# Patient Record
Sex: MALE | Race: WHITE
[De-identification: names, ages, dates, MRNs, and addresses within clinical notes are randomized per-mention and may not be internally consistent; named-entity substitution may affect disease eponyms.]

---

## 2018-05-22 ENCOUNTER — HOSPITAL ENCOUNTER (EMERGENCY)
Dept: HOSPITAL 97 - ER | Age: 78
Discharge: HOME | End: 2018-05-22
Payer: COMMERCIAL

## 2018-05-22 VITALS — DIASTOLIC BLOOD PRESSURE: 81 MMHG | SYSTOLIC BLOOD PRESSURE: 158 MMHG | OXYGEN SATURATION: 99 % | TEMPERATURE: 98.2 F

## 2018-05-22 DIAGNOSIS — L02.213: Primary | ICD-10-CM

## 2018-05-22 DIAGNOSIS — Z95.818: ICD-10-CM

## 2018-05-22 DIAGNOSIS — I10: ICD-10-CM

## 2018-05-22 PROCEDURE — 0J960ZZ DRAINAGE OF CHEST SUBCUTANEOUS TISSUE AND FASCIA, OPEN APPROACH: ICD-10-PCS

## 2018-05-22 PROCEDURE — 99283 EMERGENCY DEPT VISIT LOW MDM: CPT

## 2018-05-22 NOTE — ER
Nurse's Notes                                                                                     

 [facility]                                                                

Name: Julien Calderon                                                                               

Age: 77 yrs                                                                                       

Sex: Male                                                                                         

: 1940                                                                                   

MRN: [id number]                                                                                   

Arrival Date: 2018                                                                          

Time: 10:19                                                                                       

Account#: [id number]                                                                            

Bed 15                                                                                            

Private MD:                                                                                       

Diagnosis: Cutaneous abscess of chest wall                                                        

                                                                                                  

Presentation:                                                                                     

                                                                                             

10:26 Presenting complaint: Patient states: i have a sore under my left arm for 10 days now.  tw2 

      Transition of care: patient was not received from another setting of care. Onset of         

      symptoms was May 22, 2018. Risk Assessment: Do you want to hurt yourself or someone         

      else? Patient reports no desire to harm self or others. Initial Sepsis Screen: Does the     

      patient meet any 2 criteria? No. Patient's initial sepsis screen is negative. Does the      

      patient have a suspected source of infection? No. Patient's initial sepsis screen is        

      negative. Care prior to arrival: None.                                                      

10:26 Method Of Arrival: Ambulatory                                                           tw2 

10:26 Acuity: KERMIT 3                                                                           tw2 

                                                                                                  

Triage Assessment:                                                                                

10:27 General: Appears in no apparent distress. Behavior is calm, cooperative, appropriate    tw2 

      for age. Derm: raised, reddened area noted on anterior left chest wall area Abscess         

      located on left lateral anterior chest.                                                     

                                                                                                  

Historical:                                                                                       

- Allergies:                                                                                      

10:28 No Known Allergies;                                                                     tw2 

- PMHx:                                                                                           

10:28 CAD; Diabetes - IDDM; GERD; Hypertension; Myocardial infarction;                        tw2 

- PSHx:                                                                                           

10:28 CARDIAC STENTS X5; Knee surgery;                                                        tw2 

                                                                                                  

- Immunization history:: Adult Immunizations up to date.                                          

- Social history:: Smoking status: Patient/guardian denies using tobacco.                         

                                                                                                  

                                                                                                  

Screenin:23 Abuse screen: Denies threats or abuse. Denies injuries from another. Nutritional        ch  

      screening: No deficits noted. Tuberculosis screening: No symptoms or risk factors           

      identified. Fall Risk None identified.                                                      

                                                                                                  

Assessment:                                                                                       

11:23 General: Appears in no apparent distress. comfortable, Behavior is calm, cooperative,   ch  

      appropriate for age. Pain: Complains of pain in left lateral anterior chest Pain            

      currently is 2 out of 10 on a pain scale. Neuro: No deficits noted. Respiratory: Airway     

      is patent Trachea midline Respiratory effort is even, unlabored. GI: No signs and/or        

      symptoms were reported involving the gastrointestinal system. EENT: No signs and/or         

      symptoms were reported regarding the EENT system. Derm: Skin is intact, Skin is pink,       

      warm \T\ dry. Abscess located on left lateral anterior chest is half dollar sized, has no   

      drainage, is hot to touch, is red, is raised. Musculoskeletal: Circulation, motion, and     

      sensation intact.                                                                           

                                                                                                  

Vital Signs:                                                                                      

11:23  / 81; Pulse 76; Resp 18; Temp 98.2; Pulse Ox 99% on R/A; Pain 0/10;              ch  

                                                                                                  

ED Course:                                                                                        

10:19 Patient arrived in ED.                                                                  mr  

10:27 Triage completed.                                                                       tw2 

10:28 Arm band placed on.                                                                     tw2 

10:55 Nabil Holland MD is Attending Physician.                                              gs  

11:11 Fauzia Perry, RN is Primary Nurse.                                                ch  

11:23 No apparent distress. Resting quietly.                                                  ch  

11:23 Patient has correct armband on for positive identification. Placed in gown. Bed in low  ch  

      position. Call light in reach. Side rails up X 1. Adult w/ patient. Warm blanket given.     

11:23 No provider procedures requiring assistance completed. Patient did not have IV access   ch  

      during this emergency room visit.                                                           

11:23 Wound care: to abscess located on left lateral anterior chest was cleaned with soap and ch  

      water, dressed with Neosporin, 4X4s, band aid, Patient tolerated well.                      

                                                                                                  

Administered Medications:                                                                         

No medications were administered                                                                  

                                                                                                  

                                                                                                  

Outcome:                                                                                          

11:10 Discharge ordered by MD.                                                                  

11:23 Discharged to home ambulatory, with family.                                               

11:23 Condition: stable                                                                           

11:23 Discharge instructions given to patient, family, Instructed on discharge instructions,      

      follow up and referral plans. medication usage, Demonstrated understanding of               

      instructions, follow-up care, medications, Prescriptions given X 1.                         

11:27 Patient left the ED.                                                                    ch  

                                                                                                  

Signatures:                                                                                       

Fauzia Perry, RN                  Gretchen Rodriguez ch                                                                                   

Nguyen Mcduffie RN RN   tw2                                                  

Nabil Holland MD MD                                                      

                                                                                                  

**************************************************************************************************

## 2018-05-22 NOTE — EDPHYS
Physician Documentation                                                                           

 [facility]                                                                

Name: Julien Calderon                                                                               

Age: 77 yrs                                                                                       

Sex: Male                                                                                         

: 1940                                                                                   

MRN: [id number]                                                                                   

Arrival Date: 2018                                                                          

Time: 10:19                                                                                       

Account#: [id number]                                                                            

Bed 15                                                                                            

Private MD:                                                                                       

ED Physician Nabil Holland                                                                       

HPI:                                                                                              

                                                                                             

11:06 This 77 yrs old  Male presents to ER via Ambulatory with complaints of Skin    gs  

      Sore(s).                                                                                    

11:06 The patient presents with an abscess of the left lateral anterior chest, the patient    gs  

      presents with a swollen area of the . Description: The affected area is small,              

      confluent, erythematous. Onset: The symptoms/episode began/occurred 10 day(s) ago.          

      Possible cause(s): unknown. Associated signs and symptoms: Pertinent positives:             

      erythema, Pertinent negatives: drainage, fever. Severity of symptoms: At their worst        

      the symptoms were moderate, in the emergency department the symptoms are unchanged. The     

      patient has not experienced similar symptoms in the past.                                   

                                                                                                  

Historical:                                                                                       

- Allergies:                                                                                      

10:28 No Known Allergies;                                                                     tw2 

- PMHx:                                                                                           

10:28 CAD; Diabetes - IDDM; GERD; Hypertension; Myocardial infarction;                        tw2 

- PSHx:                                                                                           

10:28 CARDIAC STENTS X5; Knee surgery;                                                        tw2 

                                                                                                  

- Immunization history:: Adult Immunizations up to date.                                          

- Social history:: Smoking status: Patient/guardian denies using tobacco.                         

                                                                                                  

                                                                                                  

ROS:                                                                                              

11:06 All other systems are negative.                                                         gs  

                                                                                                  

Exam:                                                                                             

11:06 Chest/axilla:  Normal chest wall appearance and motion.  Nontender with no deformity.   gs  

      No lesions are appreciated. Cardiovascular:  Regular rate and rhythm with a normal S1       

      and S2.  No gallops, murmurs, or rubs.  Normal PMI, no JVD.  No pulse deficits.             

      Respiratory:  Lungs have equal breath sounds bilaterally, clear to auscultation and         

      percussion.  No rales, rhonchi or wheezes noted.  No increased work of breathing, no        

      retractions or nasal flaring. Abdomen/GI:  Soft, non-tender, with normal bowel sounds.      

      No distension or tympany.  No guarding or rebound.  No evidence of tenderness               

      throughout.                                                                                 

11:06 Constitutional: The patient appears alert, awake.                                           

11:06 Skin: abscess, that is small, approximately  3 cm(s), of the left lateral anterior          

      chest, with induration, with pointing, that is subtle.                                      

                                                                                                  

Vital Signs:                                                                                      

11:23  / 81; Pulse 76; Resp 18; Temp 98.2; Pulse Ox 99% on R/A; Pain 0/10;              ch  

                                                                                                  

Procedures:                                                                                       

11:06 I \T\ D: Incision and drainage was performed for an abscess of the left Prepped with      gs

      alcohol, Incised with 18ga. Drained small amount the patient tolerated the procedure        

      well.                                                                                       

                                                                                                  

MDM:                                                                                              

11:06 Patient medically screened.                                                             gs  

11:06 Differential diagnosis: abscess, cellulitis. Data reviewed: vital signs, nurses notes,  gs  

      and as a result, I will discharge patient.                                                  

                                                                                                  

Administered Medications:                                                                         

No medications were administered                                                                  

                                                                                                  

                                                                                                  

Disposition:                                                                                      

18 11:10 Discharged to Home. Impression: Cutaneous abscess of chest wall.                   

- Condition is Stable.                                                                            

- Discharge Instructions: Abscess, Incision and Drainage.                                         

- Prescriptions for Clindamycin HCl 150 mg Oral Capsule - take 2 capsule by ORAL route            

  every 8 hours for 7 days; 42 capsule.                                                           

- Medication Reconciliation Form, Thank You Letter, Antibiotic Education, Prescription            

  Opioid Use form.                                                                                

- Follow up: Private Physician; When: 2 - 3 days; Reason: Re-evaluation by your                   

  physician.                                                                                      

                                                                                                  

                                                                                                  

                                                                                                  

Signatures:                                                                                       

Fauzia Perry, RN                  RN                                                      

Nguyen Mcduffie RN                          RN   2                                                  

Nabil Holland MD MD                                                      

                                                                                                  

Corrections: (The following items were deleted from the chart)                                    

11:27 11:10 2018 11:10 Discharged to Home. Impression: Cutaneous abscess of chest wall.   

      Condition is Stable. Forms are Medication Reconciliation Form, Thank You Letter,            

      Antibiotic Education, Prescription Opioid Use. Follow up: Private Physician; When: 2 -      

      3 days; Reason: Re-evaluation by your physician. gs                                         

                                                                                                  

**************************************************************************************************

## 2022-04-20 ENCOUNTER — HOSPITAL ENCOUNTER (EMERGENCY)
Dept: HOSPITAL 97 - ER | Age: 82
Discharge: HOME | End: 2022-04-20
Payer: COMMERCIAL

## 2022-04-20 VITALS — OXYGEN SATURATION: 100 % | SYSTOLIC BLOOD PRESSURE: 156 MMHG | DIASTOLIC BLOOD PRESSURE: 70 MMHG | TEMPERATURE: 97.9 F

## 2022-04-20 DIAGNOSIS — I25.2: ICD-10-CM

## 2022-04-20 DIAGNOSIS — I10: ICD-10-CM

## 2022-04-20 DIAGNOSIS — E11.9: ICD-10-CM

## 2022-04-20 DIAGNOSIS — I25.10: ICD-10-CM

## 2022-04-20 DIAGNOSIS — M47.892: Primary | ICD-10-CM

## 2022-04-20 DIAGNOSIS — K21.9: ICD-10-CM

## 2022-04-20 PROCEDURE — 72125 CT NECK SPINE W/O DYE: CPT

## 2022-04-20 PROCEDURE — 99283 EMERGENCY DEPT VISIT LOW MDM: CPT

## 2022-04-20 PROCEDURE — 82947 ASSAY GLUCOSE BLOOD QUANT: CPT

## 2022-04-20 PROCEDURE — 96372 THER/PROPH/DIAG INJ SC/IM: CPT

## 2022-04-20 NOTE — EDPHYS
Physician Documentation                                                                           

 [facility]                                                                 

Name: Julien Calderon                                                                               

Age: 81 yrs                                                                                       

Sex: Male                                                                                         

: 1940                                                                                   

MRN: [id number]                                                                                   

Arrival Date: 2022                                                                          

Time: 01:16                                                                                       

Account#: [id number]                                                                            

Bed 16                                                                                            

Private MD:                                                                                       

[facility] Physician Felton Lamb                                                                      

HPI:                                                                                              

                                                                                             

03:10 This 81 yrs old  Male presents to ER via Ambulatory with complaints of Neck    mh7 

      Pain, <24hrs Old.                                                                           

03:10 The patient or guardian complains of pain, that is acute. The symptoms are located at   mh7 

      the cervical spine. Onset: The symptoms/episode began/occurred 2 day(s) ago. Context:       

      The problem was sustained at home, The neck injury/problem resulted from from unknown       

      cause. Associated signs and symptoms: Pertinent negatives: chills, constipation, fever,     

      headache, bladder incontinence, bowel incontinence, nausea, numbness, tingling,             

      vomiting, weakness. The pain does not radiate. Modifying factors: The symptoms are          

      alleviated by remaining still, the symptoms are aggravated by movement. Severity of         

      symptoms: At their worst the symptoms were moderate, 1 day(s) ago, in the emergency         

      department the symptoms have improved, mildly.                                              

                                                                                                  

Historical:                                                                                       

- Allergies:                                                                                      

01:52 No Known Allergies;                                                                     lp1 

- Home Meds:                                                                                      

01:52 Unable to obtain [Active];                                                              lp1 

- PMHx:                                                                                           

01:52 CAD; Diabetes - IDDM; GERD; Hypertension; Myocardial infarction;                        lp1 

- PSHx:                                                                                           

01:52 Laminectomy; carpel tunnel; left knee sx; Eye sx;                                       lp1 

                                                                                                  

- Immunization history:: Adult Immunizations up to date, Client reports receiving the             

  Ted \T\ Ted single-dose vaccine.                                                        

- Social history:: Smoking status: Patient denies any tobacco usage or history of.                

                                                                                                  

                                                                                                  

ROS:                                                                                              

03:10 Constitutional: Negative for fever, chills, and weight loss, Eyes: Negative for injury, mh7 

      pain, redness, and discharge, ENT: Negative for injury, pain, and discharge,                

      Cardiovascular: Negative for chest pain, palpitations, and edema, Respiratory: Negative     

      for shortness of breath, cough, wheezing, and pleuritic chest pain, Abdomen/GI:             

      Negative for abdominal pain, nausea, vomiting, diarrhea, and constipation, Back:            

      Negative for injury and pain, : Negative for injury, bleeding, discharge, and             

      swelling, MS/Extremity: Negative for injury and deformity, Skin: Negative for injury,       

      rash, and discoloration, Neuro: Negative for headache, weakness, numbness, tingling,        

      and seizure, Psych: Negative for depression, anxiety, suicide ideation, homicidal           

      ideation, and hallucinations, Allergy/Immunology: Negative for hives, rash, and             

      allergies, Endocrine: Negative for neck swelling, polydipsia, polyuria, polyphagia, and     

      marked weight changes, Hematologic/Lymphatic: Negative for swollen nodes, abnormal          

      bleeding, and unusual bruising.                                                             

                                                                                                  

Exam:                                                                                             

03:10 Head/Face:  Normocephalic, atraumatic. Eyes:  Pupils equal round and reactive to light, mh7 

      extra-ocular motions intact.  Lids and lashes normal.  Conjunctiva and sclera are           

      non-icteric and not injected.  Cornea within normal limits.  Periorbital areas with no      

      swelling, redness, or edema. ENT:  Nares patent. No nasal discharge, no septal              

      abnormalities noted.  Tympanic membranes are normal and external auditory canals are        

      clear.  Oropharynx with no redness, swelling, or masses, exudates, or evidence of           

      obstruction, uvula midline.  Mucous membranes moist.                                        

03:10 Chest/axilla:  Normal chest wall appearance and motion.  Nontender with no deformity.       

      No lesions are appreciated. Cardiovascular:  Regular rate and rhythm with a normal S1       

      and S2.  No gallops, murmurs, or rubs.  Normal PMI, no JVD.  No pulse deficits.             

      Respiratory:  Lungs have equal breath sounds bilaterally, clear to auscultation and         

      percussion.  No rales, rhonchi or wheezes noted.  No increased work of breathing, no        

      retractions or nasal flaring. Abdomen/GI:  Soft, non-tender, with normal bowel sounds.      

      No distension or tympany.  No guarding or rebound.  No evidence of tenderness               

      throughout. Back:  No spinal tenderness.  No costovertebral tenderness.  Full range of      

      motion. Skin:  Warm, dry with normal turgor.  Normal color with no rashes, no lesions,      

      and no evidence of cellulitis. MS/ Extremity:  Pulses equal, no cyanosis.                   

      Neurovascular intact.  Full, normal range of motion. Neuro:  Awake and alert, GCS 15,       

      oriented to person, place, time, and situation.  Cranial nerves II-XII grossly intact.      

      Motor strength 5/5 in all extremities.  Sensory grossly intact.  Cerebellar exam            

      normal.  Normal gait. Psych:  Awake, alert, with orientation to person, place and time.     

       Behavior, mood, and affect are within normal limits.                                       

03:10 Constitutional: The patient appears in no acute distress, alert, awake, uncomfortable.      

03:10 Neck: External neck: tenderness, that is moderate, of the  left mid cervical area,          

      right mid cervical area, left trapezius and right trapezius, C-spine: appears grossly       

      normal, no vertebral tenderness, no crepitus, Thyroid: appears normal, Trachea: is          

      midline with no obvious abnormalities, ROM/movement: limited range of motion, that is       

      mild, when rotating to the right, when rotating to the left, Meningeal signs: are not       

      present, nuchal rigidity, is not appreciated, Lymph nodes: no appreciated                   

      lymphadenopathy.                                                                            

                                                                                                  

Vital Signs:                                                                                      

01:51  / 70; Pulse 62; Resp 18; Temp 97.9(O); Pulse Ox 100% on R/A; Weight 81.65 kg     lp1 

      (R); Height 5 ft. 9 in. (175.26 cm); Pain 10/10;                                            

01:51 Body Mass Index 26.58 (81.65 kg, 175.26 cm)                                             lp1 

                                                                                                  

MDM:                                                                                              

06:09 Differential diagnosis: arthritis, C-Spine Fracture Cervical Discogenic Pain Cervical   mh7 

      Facet Syndrome Cervical Raiculopathy Cervical Spondylosis cervical strain, Degenerative     

      Disc Disease Osteoarthritis torticollis. Data reviewed: vital signs, nurses notes,          

      radiologic studies, CT scan. Data interpreted: Pulse oximetry: on room air is 100 %.        

      Interpretation: normal. Counseling: I had a detailed discussion with the patient and/or     

      guardian regarding: the historical points, exam findings, and any diagnostic results        

      supporting the discharge/admit diagnosis, the presence of at least one elevated blood       

      pressure reading (>120/80) during this emergency department visit, radiology results,       

      the need for outpatient follow up, to return to the emergency department if symptoms        

      worsen or persist or if there are any questions or concerns that arise at home.             

      Response to treatment: the patient's symptoms have resolved after treatment, the            

      patient's blood pressure is in an acceptable range, mental status has returned to           

      baseline, the patient no longer shows bradycardia, the patient is not short of breath,      

      the patient is not tachycardic, the patient's pain is gone, the patient's temperature       

      has normalized.                                                                             

06:11 Patient medically screened.                                                             [facility] 

                                                                                                  

                                                                                             

03:51 Order name: Glucose, Ancillary Testing; Complete Time: 04:07                            EDMS

                                                                                             

03:07 Order name: CT C Spine                                                                  [facility] 

                                                                                                  

Administered Medications:                                                                         

03:14 Drug: morphine 4 mg Route: IM; Site: right deltoid;                                     sm5 

04:41 Follow up: Response: Pain is decreased                                                  5 

                                                                                                  

                                                                                                  

Disposition Summary:                                                                              

22 06:11                                                                                    

Discharge Ordered                                                                                 

      Location: Home                                                                          [facility] 

      Problem: new                                                                            [facility] 

      Symptoms: have improved                                                                 [facility] 

      Condition: Stable                                                                       [facility] 

      Diagnosis                                                                                   

        - Other spondylosis, cervical region                                                  [facility] 

      Followup:                                                                               [facility] 

        - With: Private Physician                                                                  

        - When: 1 - 2 days                                                                         

        - Reason: Worsening of condition, Recheck today's complaints, Continuance of care,         

      Re-evaluation by your physician                                                             

      Discharge Instructions:                                                                     

        - Discharge Summary Sheet                                                             [facility] 

        - Cervical Radiculopathy, Easy-to-Read                                                [facility] 

      Forms:                                                                                      

        - Medication Reconciliation Form                                                      [facility] 

        - Thank You Letter                                                                    [facility] 

        - Antibiotic Education                                                                [facility] 

        - Prescription Opioid Use                                                             [facility] 

Signatures:                                                                                       

Dispatcher [employer]                           [facility]                                                 

Radha Hartley, FIDEL                         RN   lp1                                                  

Felton Lamb MD MD   7                                                  

Lori Nichols RN                        RN   sm5                                                  

                                                                                                  

**************************************************************************************************

## 2022-04-20 NOTE — RAD REPORT
EXAM DESCRIPTION:  CT - C Spine Wo Con - 4/20/2022 6:44 am

 

CLINICAL HISTORY:  The patient is 81 years old and is Male; Cervical radiculopathy, no red flags

 

TECHNIQUE:  Axial computed tomography images of the cervical spine without intravenous contrast.   Sa
gittal and coronal reformatted images were created and reviewed.   This CT exam was performed using o
ne or more of the following dose reduction techniques:   automated exposure control, adjustment of th
e mA and/or kV according to patient size, and/or use of iterative reconstruction technique.

 

COMPARISON:  No relevant prior studies available.

 

FINDINGS:  VERTEBRAE:   The vertebral body heights and alignment are maintained. There is no acute fr
acture.

   DISCS/SPINAL CANAL/NEURAL FORAMINA:   Intervertebral disc space narrowing with anterior osteophyte
 formation at C4-C5, C5-C6, and C6-7 is noted. Endplate irregularity and sclerosis at these levels is
 noted. Mild neural foraminal narrowing secondary to posterior disc osteophyte complexes from C4 thro
ugh C7 is present. There is no significant canal stenosis.

   SOFT TISSUES:   The soft tissues are normal.

   VASCULATURE:   Atherosclerosis of the vasculature is present.

 

IMPRESSION:  Moderate spondylosis of the cervical spine as described.

 

Electronically signed by:   Bianca Mayorga MD   4/20/2022 5:08 AM CDT Workstation: [id number]

 

 

Due to temporary technical issues with the PACS/Fluency reporting system, reports are being signed by
 the in house radiologist without review as a courtesy to ensure prompt reporting. The interpreting r
adiologist is fully responsible for the content of the report.

## 2022-04-20 NOTE — ER
Nurse's Notes                                                                                     

 [facility]                                                                 

Name: Julien Calderon                                                                               

Age: 81 yrs                                                                                       

Sex: Male                                                                                         

: 1940                                                                                   

MRN: [id number]                                                                                   

Arrival Date: 2022                                                                          

Time: 01:16                                                                                       

Account#: [id number]                                                                            

Bed 16                                                                                            

Private MD:                                                                                       

Diagnosis: Other spondylosis, cervical region                                                     

                                                                                                  

Presentation:                                                                                     

                                                                                             

01:49 Chief complaint: Chief complaint: Patient states: Worsening pain to back of neck that   lp1 

      began 1 day ago; reports pain on movement; Hx of laminectomy.                               

01:50 Coronavirus screen: At this time, the client does not indicate any symptoms associated  lp1 

      with coronavirus-19. Ebola Screen: No symptoms or risks identified at this time. Risk       

      Assessment: Do you want to hurt yourself or someone else? Patient reports no desire to      

      harm self or others. Onset of symptoms was 2022.                                  

01:50 Method Of Arrival: Ambulatory                                                           lp1 

01:50 Acuity: KERMIT 3                                                                           lp1 

01:51 Initial Sepsis Screen: Does the patient meet any 2 criteria? No. Patient's initial      lp1 

      sepsis screen is negative. Does the patient have a suspected source of infection? No.       

      Patient's initial sepsis screen is negative.                                                

                                                                                                  

Historical:                                                                                       

- Allergies:                                                                                      

01:52 No Known Allergies;                                                                     lp1 

- Home Meds:                                                                                      

01:52 Unable to obtain [Active];                                                              lp1 

- PMHx:                                                                                           

01:52 CAD; Diabetes - IDDM; GERD; Hypertension; Myocardial infarction;                        lp1 

- PSHx:                                                                                           

01:52 Laminectomy; carpel tunnel; left knee sx; Eye sx;                                       lp1 

                                                                                                  

- Immunization history:: Adult Immunizations up to date, Client reports receiving the             

  Ted \T\ Ted single-dose vaccine.                                                        

- Social history:: Smoking status: Patient denies any tobacco usage or history of.                

                                                                                                  

                                                                                                  

Screenin:18 Abuse screen: Denies threats or abuse. Denies injuries from another. Nutritional        sm5 

      screening: No deficits noted. Tuberculosis screening: No symptoms or risk factors           

      identified. Fall Risk None identified.                                                      

                                                                                                  

Assessment:                                                                                       

03:17 General: Appears in no apparent distress. Behavior is cooperative. Pain: Complains of   sm5 

      pain in neck. Neuro: No deficits noted. Level of Consciousness is awake, alert, obeys       

      commands, Oriented to person, place, time, situation. Cardiovascular: No deficits           

      noted. Capillary refill < 3 seconds Patient's skin is warm and dry. Respiratory: No         

      deficits noted. Airway is patent Trachea midline Respiratory effort is even, unlabored.     

      Musculoskeletal: Reports pain in neck.                                                      

03:17 Reassessment: pt states he is a diabetic and feels like his sugar may be low. pt's      sm5 

      blood glucose tested with a result of 154.                                                  

04:42 Reassessment: No changes from previously documented assessment. Patient and/or family   sm5 

      updated on plan of care and expected duration. Pain level reassessed. Patient is alert,     

      oriented x 3, equal unlabored respirations, skin warm/dry/pink.                             

                                                                                                  

Vital Signs:                                                                                      

01:51  / 70; Pulse 62; Resp 18; Temp 97.9(O); Pulse Ox 100% on R/A; Weight 81.65 kg     lp1 

      (R); Height 5 ft. 9 in. (175.26 cm); Pain 10/10;                                            

01:51 Body Mass Index 26.58 (81.65 kg, 175.26 cm)                                             lp1 

                                                                                                  

ED Course:                                                                                        

01:16 Patient arrived in ED.                                                                  bp1 

01:49 Arm band placed on right wrist.                                                         lp1 

01:51 Triage completed.                                                                       lp1 

02:12 Felton Lamb MD is Attending Physician.                                             [facility] 

02:35 Lori Nichols, RN is Primary Nurse.                                                      sm5 

03:18 Patient has correct armband on for positive identification. Bed in low position. Call   sm5 

      light in reach. Side rails up X2.                                                           

04:22 CT C Spine In Process Unspecified.                                                      EDMS

06:19 No provider procedures requiring assistance completed. Patient did not have IV access   vc1 

      during this emergency room visit.                                                           

                                                                                                  

Administered Medications:                                                                         

03:14 Drug: morphine 4 mg Route: IM; Site: right deltoid;                                     sm5 

04:41 Follow up: Response: Pain is decreased                                                  sm5 

                                                                                                  

                                                                                                  

Outcome:                                                                                          

06:11 Discharge ordered by MD.                                                                7 

06:19 Discharged to home ambulatory.                                                          vc1 

06:19 Condition: good                                                                             

06:19 Instructed on follow up and referral plans.                                                 

06:19 Patient left the ED.                                                                    vc1 

                                                                                                  

Signatures:                                                                                       

Dispatcher [employer]                           EDMS                                                 

Radha Hartley RN                         RN   lp1                                                  

Jodi Otriz                           bp1                                                  

Felton Lamb MD MD   Lori Varela RN RN   5                                                  

Paula Hill RN RN   vc1                                                  

                                                                                                  

Corrections: (The following items were deleted from the chart)                                    

01:51 01:49 Chief complaint: lp1                                                              lp1 

01:56 01:51 Pain 10/10; lp1                                                                   lp1 

01:57 01:51 Pulse 62bpm; Resp 18bpm; Pulse Ox 100% RA; Temp 97.9F Oral; 81.65 kg Reported;    lp1 

      Height 5 ft. 9 in.; BMI: 26.5; Pain 10/10; lp1                                              

                                                                                                  

**************************************************************************************************